# Patient Record
Sex: MALE | Race: WHITE | URBAN - NONMETROPOLITAN AREA
[De-identification: names, ages, dates, MRNs, and addresses within clinical notes are randomized per-mention and may not be internally consistent; named-entity substitution may affect disease eponyms.]

---

## 2017-01-01 ENCOUNTER — ALLIED HEALTH/NURSE VISIT (OUTPATIENT)
Dept: RADIATION ONCOLOGY | Facility: HOSPITAL | Age: 77
End: 2017-01-01

## 2017-01-01 ENCOUNTER — OFFICE VISIT (OUTPATIENT)
Dept: RADIATION ONCOLOGY | Facility: HOSPITAL | Age: 77
End: 2017-01-01
Attending: RADIOLOGY
Payer: COMMERCIAL

## 2017-01-01 ENCOUNTER — OFFICE VISIT (OUTPATIENT)
Dept: RADIATION ONCOLOGY | Facility: HOSPITAL | Age: 77
End: 2017-01-01

## 2017-01-01 VITALS
HEART RATE: 76 BPM | DIASTOLIC BLOOD PRESSURE: 70 MMHG | WEIGHT: 137.8 LBS | RESPIRATION RATE: 16 BRPM | BODY MASS INDEX: 22.25 KG/M2 | SYSTOLIC BLOOD PRESSURE: 138 MMHG

## 2017-01-01 VITALS
SYSTOLIC BLOOD PRESSURE: 122 MMHG | HEART RATE: 68 BPM | DIASTOLIC BLOOD PRESSURE: 70 MMHG | WEIGHT: 136.9 LBS | RESPIRATION RATE: 16 BRPM | BODY MASS INDEX: 22.11 KG/M2

## 2017-01-01 DIAGNOSIS — C79.51 BONE METASTASIS: ICD-10-CM

## 2017-01-01 DIAGNOSIS — C61 MALIGNANT NEOPLASM OF PROSTATE (H): Primary | ICD-10-CM

## 2017-01-01 PROCEDURE — 77412 RADIATION TX DELIVERY LVL 3: CPT | Performed by: RADIOLOGY

## 2017-01-01 RX ORDER — OXYCODONE AND ACETAMINOPHEN 7.5; 325 MG/1; MG/1
1 TABLET ORAL EVERY 4 HOURS PRN
Qty: 80 TABLET | Refills: 0 | Status: SHIPPED | OUTPATIENT
Start: 2017-01-01

## 2017-01-01 RX ORDER — OXYCODONE AND ACETAMINOPHEN 7.5; 325 MG/1; MG/1
1 TABLET ORAL EVERY 4 HOURS PRN
COMMUNITY
End: 2017-01-01

## 2017-01-01 ASSESSMENT — PAIN SCALES - GENERAL
PAINLEVEL: MILD PAIN (2)
PAINLEVEL: NO PAIN (0)

## 2017-01-03 NOTE — PROGRESS NOTES
Gil Vazquez received radiation therapy treatment today 01/03/2017.    Linda Velazquez  January 3, 2017  1:33 PM

## 2017-01-04 NOTE — PROGRESS NOTES
Gil Vazquez received radiation therapy treatment today 01/04/2017.    Juani Hernandes  January 4, 2017  10:20 AM

## 2017-01-05 NOTE — PROGRESS NOTES
Gil Vazquez received radiation therapy treatment today 01/05/2017.    Linda Velazquez  January 5, 2017  11:38 AM

## 2017-01-06 NOTE — PROGRESS NOTES
Gil Vazquez received radiation therapy treatment today 01/06/2017.    Nu Dumont  January 6, 2017  10:56 AM

## 2017-01-09 NOTE — PROGRESS NOTES
Gil Vazquez received radiation therapy treatment today 01/09/2017.    Juani Hernandes  January 9, 2017  10:56 AM

## 2017-01-10 NOTE — PROGRESS NOTES
Gil Vazquez received radiation therapy treatment today 01/10/2017.    Nu Dumont  January 10, 2017  10:28 AM

## 2017-01-11 NOTE — MR AVS SNAPSHOT
After Visit Summary   1/11/2017    Gil Vazquez    MRN: [id number]           Patient Information     Date Of Birth          1940        Visit Information        Provider Department      1/11/2017 11:30 AM Kiran Nair MD [facility]        Today's Diagnoses     Malignant neoplasm of prostate ([facility])    -  1        Follow-ups after your visit        Your next 10 appointments already scheduled     Jan 11, 2017 11:30 AM   on treatment visit with Kiran Nair MD   [facility] ([facility] )    [address] [id number] [phone]            Jan 12, 2017 11:00 AM   Treatment with [facility]   [facility] ([facility] )    [address] [id number] [phone]            Jan 13, 2017 11:00 AM   Treatment with [facility]   [facility] ([facility] )    [address] [phone]            Jan 16, 2017 11:00 AM   Treatment with [facility]   [facility] ([facility] )    [address] [id number] [phone]              Who to contact     If you have questions or need follow up information about today's clinic visit or your schedule please contact [facility] directly at [phone].  Normal or non-critical lab and imaging results will be communicated to you by MyChart, letter or phone within 4 business days after the clinic has received the results. If you do not hear from us within 7 days, please contact the clinic through MyChart or phone. If you have a critical or abnormal lab result, we will notify you by phone as soon as possible.  Submit refill requests through [employer] or call your pharmacy and they will forward the refill request to us. Please allow 3 business days for your refill to be completed.          Additional Information About Your Visit        MyChart Information      "[employer] lets you send messages to your doctor, view your test results, renew your prescriptions, schedule appointments and more. To sign up, go to [email or web address] . Click on \"Log in\" on the left side of the screen, which will take you to the Welcome page. Then click on \"Sign up Now\" on the right side of the page.     You will be asked to enter the access code listed below, as well as some personal information. Please follow the directions to create your username and password.     Your access code is: [email or web address]  Expires: 3/16/2017 11:33 AM     Your access code will  in 90 days. If you need help or a new code, please call your [place] clinic or [phone].        Care EveryWhere ID     This is your Care EveryWhere ID. This could be used by other organizations to access your [facility] medical records  [id number]        Your Vitals Were     Pulse Respirations                76 16           Blood Pressure from Last 3 Encounters:   17 138/70   16 142/74    Weight from Last 3 Encounters:   17 62.506 kg (137 lb 12.8 oz)   16 62.596 kg (138 lb)              Today, you had the following     No orders found for display         Today's Medication Changes          These changes are accurate as of: 17 11:02 AM.  If you have any questions, ask your nurse or doctor.               These medicines have changed or have updated prescriptions.        Dose/Directions    oxyCODONE-acetaminophen 7.5-325 MG per tablet   Commonly known as:  PERCOCET   This may have changed:  Another medication with the same name was removed. Continue taking this medication, and follow the directions you see here.   Changed by:  Kiran Nair MD        Dose:  1 tablet   Take 1 tablet by mouth every 4 hours as needed for moderate to severe pain   Refills:  0                Primary Care Provider Office Phone # Fax #    Osbaldo Sawyer [phone]       [address]" [facility] [address]        Thank you!     Thank you for choosing [facility]  for your care. Our goal is always to provide you with excellent care. Hearing back from our patients is one way we can continue to improve our services. Please take a few minutes to complete the written survey that you may receive in the mail after your visit with us. Thank you!             Your Updated Medication List - Protect others around you: Learn how to safely use, store and throw away your medicines at www.disposemymeds.org.          This list is accurate as of: 1/11/17 11:02 AM.  Always use your most recent med list.                   Brand Name Dispense Instructions for use    leuprolide 30 MG kit    LUPRON DEPOT     Inject 30 mg into the muscle every 4 months       oxyCODONE-acetaminophen 7.5-325 MG per tablet    PERCOCET     Take 1 tablet by mouth every 4 hours as needed for moderate to severe pain

## 2017-01-11 NOTE — PROGRESS NOTES
RADIATION THERAPY PROGRESS NOTE      REFERRING PHYSICIAN:  Osbaldo Sawyer MD.      DIAGNOSES:  Metastatic prostate carcinoma, recent onset of left sacroiliac pain and perception of numbness on the bottom of his left foot distally.      RADIATION RX:  Tamika Hauser has received 2100 of prescribed 3000 cGy to L4 through sacral levels for treatment of metastatic prostate carcinoma.      SUBJECTIVE:  The patient states that his pain is significantly better, as is the numbness on the plantar aspect of his left foot.  He believes they are approximately 90% improved.  He is having no diarrhea.      OBJECTIVE:  Weight, 137.8 pounds.  Abdomen benign, nontender.  Bowel sounds fairly quiet.      IMPRESSION:  Routine tolerance to radiation therapy with excellent pain palliation to date.       PLAN:  Continue treatment as planned.  I will speak with Dr. Sawyer and consider whether Tamika may benefit from Medical Oncology consultation.         AUBREE BERMUDEZ MD             D: 2017 11:14   T: 2017 13:22   MT: DW      Name:     TAMIKA BURRELL   MRN:      [id number]        Account:      [id number]   :      1940           Service Date: 2017      Document: [id number]       cc: Osbaldo Sawyer MD

## 2017-01-11 NOTE — PROGRESS NOTES
Patient was assessed using the NCCN psychosocial distress thermometer. Patient rated the score as a 0. Patient rated current stressors as NA. Stressors will be brought to the attention of provider or Oncology RN Care Coordinator for a score of 6 or greater or per nurses discretion.

## 2017-01-11 NOTE — PROGRESS NOTES
Gil Vazquez received radiation therapy treatment today 01/11/2017.    Nitin Scott  January 11, 2017  10:57 AM

## 2017-01-12 NOTE — PROGRESS NOTES
Gil Vazquez received radiation therapy treatment today 01/12/2017.    Nitin Scott  January 12, 2017  10:48 AM

## 2017-01-13 NOTE — PROGRESS NOTES
Gil Vazquez received radiation therapy treatment today 01/13/2017.    Nu Dumont  January 13, 2017  10:44 AM

## 2017-01-16 NOTE — PROGRESS NOTES
Patient was assessed using the NCCN psychosocial distress thermometer. Patient rated the score as a 0. Patient rated current stressors as n/a. Stressors will be brought to the attention of provider or Oncology RN Care Coordinator for a score of 6 or greater or per nurses discretion.

## 2017-01-16 NOTE — MR AVS SNAPSHOT
"              After Visit Summary   1/16/2017    Gil Vazquez    MRN: [id number]           Patient Information     Date Of Birth          1940        Visit Information        Provider Department      1/16/2017 11:15 AM Kiran Nair MD [facility]        Today's Diagnoses     Bone metastasis (H)            Follow-ups after your visit        Your next 10 appointments already scheduled     Jan 16, 2017 11:15 AM   on treatment visit with Kiran Nair MD   [facility] ([facility] )    [address] [place] [id number] [phone]              Who to contact     If you have questions or need follow up information about today's clinic visit or your schedule please contact [facility] directly at [phone].  Normal or non-critical lab and imaging results will be communicated to you by [employer], letter or phone within 4 business days after the clinic has received the results. If you do not hear from us within 7 days, please contact the clinic through [employer] or phone. If you have a critical or abnormal lab result, we will notify you by phone as soon as possible.  Submit refill requests through [employer] or call your pharmacy and they will forward the refill request to us. Please allow 3 business days for your refill to be completed.          Additional Information About Your Visit        [employer] Information     [employer] lets you send messages to your doctor, view your test results, renew your prescriptions, schedule appointments and more. To sign up, go to [email or web address] . Click on \"Log in\" on the left side of the screen, which will take you to the Welcome page. Then click on \"Sign up Now\" on the right side of the page.     You will be asked to enter the access code listed below, as well as some personal information. Please follow the directions to create your username and password.     Your access code is: [email or web address]  Expires: 3/16/2017 " 11:33 AM     Your access code will  in 90 days. If you need help or a new code, please call your [facility] or [phone].        Care EveryWhere ID     This is your Care EveryWhere ID. This could be used by other organizations to access your [facility] medical records  [id number]        Your Vitals Were     Pulse Respirations                68 16           Blood Pressure from Last 3 Encounters:   17 122/70   17 138/70   16 142/74    Weight from Last 3 Encounters:   17 62.097 kg (136 lb 14.4 oz)   17 62.506 kg (137 lb 12.8 oz)   16 62.596 kg (138 lb)              Today, you had the following     No orders found for display       Primary Care Provider Office Phone # Fax #    Osbaldo GERONIMO Silverio [phone]       [place] [address] [address] [facility] [address]        Thank you!     Thank you for choosing [facility]  for your care. Our goal is always to provide you with excellent care. Hearing back from our patients is one way we can continue to improve our services. Please take a few minutes to complete the written survey that you may receive in the mail after your visit with us. Thank you!             Your Updated Medication List - Protect others around you: Learn how to safely use, store and throw away your medicines at www.disposemymeds.org.          This list is accurate as of: 17 11:02 AM.  Always use your most recent med list.                   Brand Name Dispense Instructions for use    leuprolide 30 MG kit    LUPRON DEPOT     Inject 30 mg into the muscle every 4 months       oxyCODONE-acetaminophen 7.5-325 MG per tablet    PERCOCET    80 tablet    Take 1 tablet by mouth every 4 hours as needed for moderate to severe pain

## 2017-01-17 NOTE — PROGRESS NOTES
RADIATION THERAPY TREATMENT SUMMARY      REFERRING PHYSICIAN:  Osbaldo Sawyer MD.       DIAGNOSIS:  Metastatic prostate carcinoma.      TREATMENT INTENT:  Palliation.      AREA TREATED:  L4 through sacrum.      TREATMENT TECHNIQUE:  Photons 6-10.        ENERGY:  10 MV.      TUMOR DOSE:  3000 cGy.      NUMBER OF TREATMENTS:  10 fractions.      ELAPSED CALENDAR DAYS:  13 elapsed days.      COMPLETION DATE:  2017.        COMMENTS:  Mr. Vazquez was treated with palliative intent for metastatic prostate carcinoma.  He had excellent pain improvement which he estimated to be about 90% improved toward the end of his treatment course.  He was minimally symptomatic.  He will continue to follow up with Dr. Sawyer and consider further medical intervention.  I would be happy to re-evaluate him at any time.         AUBREE BERMUDEZ MD             D: 2017 09:09   T: 2017 09:28   MT: RAJAN      Name:     TAMIKA VAZQUEZ   MRN:      [id number]        Account:      [id number]   :      1940           Service Date: 2017      Document: [id number]       cc: Osbaldo Sawyer MD

## 2022-05-09 NOTE — PROGRESS NOTES
Gli Vazquez received radiation therapy treatment today 01/16/2017.    Nitin Scott  January 16, 2017  10:50 AM     Yes